# Patient Record
Sex: FEMALE | URBAN - METROPOLITAN AREA
[De-identification: names, ages, dates, MRNs, and addresses within clinical notes are randomized per-mention and may not be internally consistent; named-entity substitution may affect disease eponyms.]

---

## 2023-08-08 ENCOUNTER — INITIAL PRENATAL (OUTPATIENT)
Age: 28
End: 2023-08-08

## 2023-08-08 VITALS
BODY MASS INDEX: 21.92 KG/M2 | SYSTOLIC BLOOD PRESSURE: 102 MMHG | DIASTOLIC BLOOD PRESSURE: 64 MMHG | HEIGHT: 69 IN | WEIGHT: 148 LBS

## 2023-08-08 DIAGNOSIS — Z34.00 SUPERVISION OF NORMAL FIRST PREGNANCY, ANTEPARTUM: Primary | ICD-10-CM

## 2023-08-08 PROCEDURE — 0500F INITIAL PRENATAL CARE VISIT: CPT | Performed by: OBSTETRICS & GYNECOLOGY

## 2023-08-08 RX ORDER — LEVOTHYROXINE SODIUM 0.05 MG/1
TABLET ORAL
COMMUNITY
Start: 2015-12-04

## 2023-08-08 NOTE — PROGRESS NOTES
Current pregnancy history:    Ezekiel Mensah is a  32 y.o. female Unavailable Patient's last menstrual period was 06/04/2023. .  She presents for the evaluation of amenorrhea and a positive pregnancy test.    LMP history:  The date of her LMP is certain. Her last menstrual period was normal.  A urine pregnancy test was positive      Based on her LMP, her EDC is 3/10/24 and her EGA is 9 weeks,2 days. Her menstrual cycles are regular and occur approximately every 28 days  and range from 3 to 5 days. Ultrasound data:  She had an  ultrasound done by the ultrasound tech today which revealed a viable evans pregnancy with a gestational age of 10 weeks and 2 days giving an EDC of 3/9/23. Pregnancy symptoms:    Since her LMP she has experienced  urinary frequency, breast tenderness, fatigue, and and nausea. She has been vomiting over the last few weeks. Associated signs and symptoms which she denies: dysuria, discharge, vaginal bleeding. Relevant past pregnancy history:   She has the following pregnancy history: G1    Relevant past medical history:(relevant to this pregnancy): noncontributory. Hypothyroidism: takes synthroid - followed by THAN[facility], last TSH WNL 2 weeks ago. Acne - on topical clindamycin and azelaic acid    Pap/Occupational history:  Last pap smear: last year with  [facility], reports normal          Substance history: negative for alcohol, tobacco and street drugs. Positive for nothing. Exposure history: There is/are no indoor cat/s in the home. The patient was instructed to not change the cat litter. She admits close contact with children on a regular basis. She has had chicken pox or the vaccine in the past.   Patient denies issues with domestic violence. Genetic Screening/Teratology Counseling: (Includes patient, baby's father, or anyone in either family with:)  3.  Patient's age >/= 28 at EDC?--no  2.   Thalassemia ([place], [place], [address], or

## 2023-08-10 LAB — BACTERIA UR CULT: NO GROWTH

## 2023-08-11 LAB
C TRACH RRNA SPEC QL NAA+PROBE: NEGATIVE
N GONORRHOEA RRNA SPEC QL NAA+PROBE: NEGATIVE
T VAGINALIS RRNA SPEC QL NAA+PROBE: NEGATIVE

## 2023-08-28 SDOH — ECONOMIC STABILITY: INCOME INSECURITY: HOW HARD IS IT FOR YOU TO PAY FOR THE VERY BASICS LIKE FOOD, HOUSING, MEDICAL CARE, AND HEATING?: NOT HARD AT ALL

## 2023-08-28 SDOH — ECONOMIC STABILITY: TRANSPORTATION INSECURITY
IN THE PAST 12 MONTHS, HAS LACK OF TRANSPORTATION KEPT YOU FROM MEETINGS, WORK, OR FROM GETTING THINGS NEEDED FOR DAILY LIVING?: NO

## 2023-08-28 SDOH — ECONOMIC STABILITY: HOUSING INSECURITY
IN THE LAST 12 MONTHS, WAS THERE A TIME WHEN YOU DID NOT HAVE A STEADY PLACE TO SLEEP OR SLEPT IN A SHELTER (INCLUDING NOW)?: NO

## 2023-08-28 SDOH — ECONOMIC STABILITY: FOOD INSECURITY: WITHIN THE PAST 12 MONTHS, YOU WORRIED THAT YOUR FOOD WOULD RUN OUT BEFORE YOU GOT MONEY TO BUY MORE.: NEVER TRUE

## 2023-08-28 SDOH — ECONOMIC STABILITY: FOOD INSECURITY: WITHIN THE PAST 12 MONTHS, THE FOOD YOU BOUGHT JUST DIDN'T LAST AND YOU DIDN'T HAVE MONEY TO GET MORE.: NEVER TRUE

## 2023-08-29 ENCOUNTER — ROUTINE PRENATAL (OUTPATIENT)
Age: 28
End: 2023-08-29

## 2023-08-29 VITALS — WEIGHT: 148 LBS | DIASTOLIC BLOOD PRESSURE: 80 MMHG | SYSTOLIC BLOOD PRESSURE: 128 MMHG | BODY MASS INDEX: 22.18 KG/M2

## 2023-08-29 DIAGNOSIS — Z34.00 SUPERVISION OF NORMAL FIRST PREGNANCY, ANTEPARTUM: Primary | ICD-10-CM

## 2023-08-29 LAB
HEP B, EXTERNAL RESULT: NORMAL
HIV, EXTERNAL RESULT: NORMAL
RUBELLA TITER, EXTERNAL RESULT: NORMAL

## 2023-08-29 PROCEDURE — 0502F SUBSEQUENT PRENATAL CARE: CPT | Performed by: OBSTETRICS & GYNECOLOGY

## 2023-08-29 RX ORDER — PYRIDOXINE HCL (VITAMIN B6) 25 MG
TABLET ORAL
COMMUNITY
Start: 2023-07-25

## 2023-08-29 RX ORDER — AZELAIC ACID 0.15 G/G
GEL TOPICAL
COMMUNITY
Start: 2023-07-31

## 2023-08-29 RX ORDER — CLINDAMYCIN PHOSPHATE 10 MG/G
1 GEL TOPICAL
COMMUNITY
Start: 2023-07-26

## 2023-08-30 LAB
ABO GROUP BLD: NORMAL
BLD GP AB SCN SERPL QL: NEGATIVE
ERYTHROCYTE [DISTWIDTH] IN BLOOD BY AUTOMATED COUNT: 12 % (ref 11.7–15.4)
HBV SURFACE AG SERPL QL IA: NEGATIVE
HCT VFR BLD AUTO: 42.1 % (ref 34–46.6)
HCV IGG SERPL QL IA: NON REACTIVE
HGB BLD-MCNC: 14 G/DL (ref 11.1–15.9)
HIV 1+2 AB+HIV1 P24 AG SERPL QL IA: NON REACTIVE
MCH RBC QN AUTO: 32.5 PG (ref 26.6–33)
MCHC RBC AUTO-ENTMCNC: 33.3 G/DL (ref 31.5–35.7)
MCV RBC AUTO: 98 FL (ref 79–97)
PLATELET # BLD AUTO: 244 X10E3/UL (ref 150–450)
RBC # BLD AUTO: 4.31 X10E6/UL (ref 3.77–5.28)
RH BLD: POSITIVE
RUBV IGG SERPL IA-ACNC: 3.55 INDEX
TSH SERPL DL<=0.005 MIU/L-ACNC: 0.84 UIU/ML (ref 0.45–4.5)
VZV IGG SER IA-ACNC: 848 INDEX
WBC # BLD AUTO: 7.6 X10E3/UL (ref 3.4–10.8)

## 2023-08-31 ENCOUNTER — TELEPHONE (OUTPATIENT)
Age: 28
End: 2023-08-31

## 2023-08-31 LAB
HGB A MFR BLD ELPH: 97.1 % (ref 96.4–98.8)
HGB A2 MFR BLD ELPH: 2.9 % (ref 1.8–3.2)
HGB F MFR BLD ELPH: 0 % (ref 0–2)
HGB FRACT BLD-IMP: NORMAL
HGB S MFR BLD ELPH: 0 %
TREPONEMA PALLIDUM IGG+IGM AB [PRESENCE] IN SERUM OR PLASMA BY IMMUNOASSAY: NON REACTIVE

## 2023-09-04 LAB
Lab: NORMAL
NTRA 1P36 DELETION SYNDROME POPULATION-BASED RISK TEXT: NORMAL
NTRA 1P36 DELETION SYNDROME RESULT TEXT: NORMAL
NTRA 1P36 DELETION SYNDROME RISK SCORE TEXT: NORMAL
NTRA 22Q11.2 DELETION SYNDROME POPULATION-BASED RISK TEXT: NORMAL
NTRA 22Q11.2 DELETION SYNDROME RESULT TEXT: NORMAL
NTRA 22Q11.2 DELETION SYNDROME RISK SCORE TEXT: NORMAL
NTRA ANGELMAN SYNDROME POPULATION-BASED RISK TEXT: NORMAL
NTRA ANGELMAN SYNDROME RESULT TEXT: NORMAL
NTRA ANGELMAN SYNDROME RISK SCORE TEXT: NORMAL
NTRA CRI-DU-CHAT SYNDROME POPULATION-BASED RISK TEXT: NORMAL
NTRA CRI-DU-CHAT SYNDROME RESULT TEXT: NORMAL
NTRA CRI-DU-CHAT SYNDROME RISK SCORE TEXT: NORMAL
NTRA FETAL FRACTION: NORMAL
NTRA GENDER OF FETUS: NORMAL
NTRA MONOSOMY X AGE-BASED RISK TEXT: NORMAL
NTRA MONOSOMY X RESULT TEXT: NORMAL
NTRA MONOSOMY X RISK SCORE TEXT: NORMAL
NTRA PRADER-WILLI SYNDROME POPULATION-BASED RISK TEXT: NORMAL
NTRA PRADER-WILLI SYNDROME RESULT TEXT: NORMAL
NTRA PRADER-WILLI SYNDROME RISK SCORE TEXT: NORMAL
NTRA TRIPLOIDY RESULT TEXT: NORMAL
NTRA TRISOMY 13 AGE-BASED RISK TEXT: NORMAL
NTRA TRISOMY 13 RESULT TEXT: NORMAL
NTRA TRISOMY 13 RISK SCORE TEXT: NORMAL
NTRA TRISOMY 18 AGE-BASED RISK TEXT: NORMAL
NTRA TRISOMY 18 RESULT TEXT: NORMAL
NTRA TRISOMY 18 RISK SCORE TEXT: NORMAL
NTRA TRISOMY 21 AGE-BASED RISK TEXT: NORMAL
NTRA TRISOMY 21 RESULT TEXT: NORMAL
NTRA TRISOMY 21 RISK SCORE TEXT: NORMAL

## 2023-09-29 ENCOUNTER — ROUTINE PRENATAL (OUTPATIENT)
Age: 28
End: 2023-09-29

## 2023-09-29 VITALS — SYSTOLIC BLOOD PRESSURE: 120 MMHG | DIASTOLIC BLOOD PRESSURE: 82 MMHG | BODY MASS INDEX: 22.18 KG/M2 | WEIGHT: 148 LBS

## 2023-09-29 DIAGNOSIS — Z34.00 SUPERVISION OF NORMAL FIRST PREGNANCY, ANTEPARTUM: ICD-10-CM

## 2023-09-29 DIAGNOSIS — Z34.90 ENCOUNTER FOR SUPERVISION OF NORMAL PREGNANCY, ANTEPARTUM, UNSPECIFIED GRAVIDITY: Primary | ICD-10-CM

## 2023-09-29 NOTE — PROGRESS NOTES
Patient arrived to room with no concerns   Would like to discuss Thyroid levels today to see about blood draw    flu vaccine today

## 2023-09-30 LAB
SPECIMEN STATUS REPORT: NORMAL
TSH SERPL DL<=0.005 MIU/L-ACNC: 1.85 UIU/ML (ref 0.45–4.5)

## 2023-10-13 ENCOUNTER — TELEPHONE (OUTPATIENT)
Age: 28
End: 2023-10-13

## 2023-10-13 DIAGNOSIS — R00.0 RAPID HEART RATE: Primary | ICD-10-CM

## 2023-10-13 NOTE — TELEPHONE ENCOUNTER
Patient called and identity confirmed. Patient informed that a cardiology appointment had been placed with Dr. Elham Powell on 10/24/2023 at 2:40. Patient states this is a good time for her and had no further questions.

## 2023-10-13 NOTE — TELEPHONE ENCOUNTER
Patient called in to check on the status of her cardiology appt. Message has been sent to the 's nurse to check on this.

## 2023-10-24 ENCOUNTER — OFFICE VISIT (OUTPATIENT)
Age: 28
End: 2023-10-24

## 2023-10-24 VITALS
OXYGEN SATURATION: 99 % | HEIGHT: 69 IN | WEIGHT: 150.2 LBS | HEART RATE: 87 BPM | BODY MASS INDEX: 22.25 KG/M2 | DIASTOLIC BLOOD PRESSURE: 88 MMHG | SYSTOLIC BLOOD PRESSURE: 118 MMHG

## 2023-10-24 DIAGNOSIS — Z34.90 PREGNANCY, UNSPECIFIED GESTATIONAL AGE: ICD-10-CM

## 2023-10-24 DIAGNOSIS — R00.2 HEART PALPITATIONS: Primary | ICD-10-CM

## 2023-10-24 DIAGNOSIS — R20.2 TINGLING IN EXTREMITIES: ICD-10-CM

## 2023-10-24 DIAGNOSIS — R06.02 SHORT OF BREATH ON EXERTION: ICD-10-CM

## 2023-10-24 ASSESSMENT — PATIENT HEALTH QUESTIONNAIRE - PHQ9
SUM OF ALL RESPONSES TO PHQ QUESTIONS 1-9: 0
SUM OF ALL RESPONSES TO PHQ QUESTIONS 1-9: 0
SUM OF ALL RESPONSES TO PHQ9 QUESTIONS 1 & 2: 0
1. LITTLE INTEREST OR PLEASURE IN DOING THINGS: 0
SUM OF ALL RESPONSES TO PHQ QUESTIONS 1-9: 0
SUM OF ALL RESPONSES TO PHQ QUESTIONS 1-9: 0
2. FEELING DOWN, DEPRESSED OR HOPELESS: 0

## 2023-10-24 NOTE — PROGRESS NOTES
[facility] [place]: Devonte Cherry  [phone]    History of Present Illness:  Ms. Odilon Woodruff is a 31 yo F followed by Dr. Harrison Brady and Dr. Roshni Rao, who is pregnant with a due date of 03/10/2024, hypothyroid, referred here for cardiac evaluation. She is accompanied by her . She has had various symptoms recently. She has had palpitations that started with her pregnancy, got better in the second trimester, but then started recurring two weeks ago. When this happens, she will feel her heart rate speed up that can last several minutes at a time. She did have a spell yesterday. She has a watch that monitors her heart rate as well. Anytime her heart rate goes greater than 70 beats per minute she will start feeling palpitations. Usually at rest, it is in the 60s. Initially, she thought it might be related to food, but this has not consistently been the case. She has felt more easily short of breath with exertion recently. No exertional chest pain. This is her first pregnancy. She also notes some soreness and tingling in her right arm. This often times happens in late morning. 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 She uses a mouse and a phone a lot. Sometimes she has cold hands and feet. She is compensated on exam with clear lungs and no lower extremity edema. Her EKG here is normal sinus rhythm, heart rate of 86. Soc hx. No tobacco  Fam hx. No early CAD. Maternal GF, CAD 46s. Assessment and Plan:   1. Palpitations, shortness of breath. Will obtain and echocardiogram and one month loop monitor to evaluate for possible arrhythmia. 2. Pregnancy. Followed by Dr. Harrison Brady. 3. Hypothyroid. Has been stable per the patient. She  has a past medical history of Hypothyroidism. All other systems negative except as above.      PE  Vitals:    10/24/23 1431   BP: 118/88   Site: Left Upper Arm   Position: Sitting   Cuff Size: Medium Adult   Pulse: 87   SpO2: 99%   Weight: 68.1 kg (150 lb 3.2 oz)

## 2023-10-24 NOTE — PATIENT INSTRUCTIONS
A 30 day loop monitor has been ordered for you. This will be mailed to the address given to us. Please read over the instructions given to you about [employer] loop monitors. Save the pre-paid box so that you can use it to mail monitor back to company. Your heart rate and rhythm will be monitored continuously and our office will be notified regarding any concerns. If you have any insurance questions regarding coverage and out of pocket cost please contact the number on the instructions - [phone].

## 2023-10-25 ENCOUNTER — TELEPHONE (OUTPATIENT)
Age: 28
End: 2023-10-25

## 2023-10-25 NOTE — TELEPHONE ENCOUNTER
Enrolled with [employer] - Ordered and being shipped to patient's home address on file. ETA within 5-7 business days.       1 month loop  Received: NEENA Loyola,      Please schedule a 1 month loop with sensitive stickers for palpitations and sob per Dr. Edison Kamara.     Thanks

## 2023-10-27 ENCOUNTER — ROUTINE PRENATAL (OUTPATIENT)
Age: 28
End: 2023-10-27

## 2023-10-27 VITALS — DIASTOLIC BLOOD PRESSURE: 70 MMHG | SYSTOLIC BLOOD PRESSURE: 110 MMHG

## 2023-10-27 DIAGNOSIS — Z34.90 ENCOUNTER FOR SUPERVISION OF NORMAL PREGNANCY, ANTEPARTUM, UNSPECIFIED GRAVIDITY: Primary | ICD-10-CM

## 2023-10-27 NOTE — PROGRESS NOTES
US today shows the following:  FETAL SURVEY A SINGLE VIABLE IUP AT 20W5D IS SEEN. FETAL CARDIAC MOTION OBSERVED. FETAL ANATOMY WAS WELL VISUALIZED AND APPEARS WNL. NO ABNORMALITIES WERE SEEN ON TODAYS EXAM. APPROPRIATE GROWTH MEASURED. SIZE = DATES. BETH, PLACENTA AND CERVIX APPEAR WITHIN NORMAL LIMITS. GENDER: MALE    Patient arrived to room with no concerns or complaints  +FM    Patient states that she saw cardiologist and everything looked well, but will be doing a holter monitor for a month and has an echo scheduled for next week.

## 2023-11-03 ENCOUNTER — TELEPHONE (OUTPATIENT)
Age: 28
End: 2023-11-03

## 2023-11-03 NOTE — TELEPHONE ENCOUNTER
----- Message from Axel Adams MD sent at 11/2/2023  8:07 PM EDT -----  Please let pt know echo was overall normal. thx

## 2023-11-21 ENCOUNTER — ROUTINE PRENATAL (OUTPATIENT)
Age: 28
End: 2023-11-21

## 2023-11-21 VITALS — SYSTOLIC BLOOD PRESSURE: 114 MMHG | BODY MASS INDEX: 23.42 KG/M2 | WEIGHT: 154 LBS | DIASTOLIC BLOOD PRESSURE: 74 MMHG

## 2023-11-21 DIAGNOSIS — Z34.00 SUPERVISION OF NORMAL FIRST PREGNANCY, ANTEPARTUM: Primary | ICD-10-CM

## 2023-11-21 PROCEDURE — 0502F SUBSEQUENT PRENATAL CARE: CPT | Performed by: OBSTETRICS & GYNECOLOGY

## 2023-11-21 NOTE — PROGRESS NOTES
Doing well. She was seen by cardiologist 3 weeks ago. Normal echo. Currently has a holter monitor.  precautions

## 2023-12-11 ENCOUNTER — TELEPHONE (OUTPATIENT)
Age: 28
End: 2023-12-11

## 2023-12-11 NOTE — TELEPHONE ENCOUNTER
----- Message from Ginger De La Torre MD sent at 12/11/2023  8:20 AM EST -----  Please let pt know heart monitor was normal. No evidence of arrhythmia.  thx

## 2023-12-19 LAB — T. PALLIDUM (SYPHILIS) ANTIBODY, EXTERNAL RESULT: NORMAL

## 2024-01-12 ENCOUNTER — ROUTINE PRENATAL (OUTPATIENT)
Age: 29
End: 2024-01-12

## 2024-01-12 VITALS — BODY MASS INDEX: 24.48 KG/M2 | WEIGHT: 161 LBS | SYSTOLIC BLOOD PRESSURE: 100 MMHG | DIASTOLIC BLOOD PRESSURE: 66 MMHG

## 2024-01-12 DIAGNOSIS — Z34.90 PREGNANCY, UNSPECIFIED GESTATIONAL AGE: Primary | ICD-10-CM

## 2024-01-12 PROCEDURE — 0502F SUBSEQUENT PRENATAL CARE: CPT | Performed by: OBSTETRICS & GYNECOLOGY

## 2024-01-30 ENCOUNTER — ROUTINE PRENATAL (OUTPATIENT)
Age: 29
End: 2024-01-30

## 2024-01-30 VITALS — WEIGHT: 165 LBS | DIASTOLIC BLOOD PRESSURE: 78 MMHG | SYSTOLIC BLOOD PRESSURE: 116 MMHG | BODY MASS INDEX: 25.09 KG/M2

## 2024-01-30 DIAGNOSIS — Z34.00 SUPERVISION OF NORMAL FIRST PREGNANCY, ANTEPARTUM: Primary | ICD-10-CM

## 2024-01-30 PROCEDURE — 0502F SUBSEQUENT PRENATAL CARE: CPT | Performed by: OBSTETRICS & GYNECOLOGY

## 2024-02-13 ENCOUNTER — ROUTINE PRENATAL (OUTPATIENT)
Age: 29
End: 2024-02-13

## 2024-02-13 VITALS — SYSTOLIC BLOOD PRESSURE: 120 MMHG | BODY MASS INDEX: 25.24 KG/M2 | WEIGHT: 166 LBS | DIASTOLIC BLOOD PRESSURE: 74 MMHG

## 2024-02-13 DIAGNOSIS — Z34.00 SUPERVISION OF NORMAL FIRST PREGNANCY, ANTEPARTUM: ICD-10-CM

## 2024-02-13 DIAGNOSIS — Z34.03 ENCOUNTER FOR SUPERVISION OF NORMAL FIRST PREGNANCY IN THIRD TRIMESTER: Primary | ICD-10-CM

## 2024-02-13 LAB
C. TRACHOMATIS, EXTERNAL RESULT: NORMAL
GBS, EXTERNAL RESULT: NORMAL
N. GONORRHOEAE, EXTERNAL RESULT: NORMAL

## 2024-02-13 PROCEDURE — 0502F SUBSEQUENT PRENATAL CARE: CPT | Performed by: OBSTETRICS & GYNECOLOGY

## 2024-02-13 RX ORDER — BREAST PUMP
EACH MISCELLANEOUS
Qty: 1 EACH | Refills: 0 | Status: SHIPPED | OUTPATIENT
Start: 2024-02-13

## 2024-02-13 NOTE — PROGRESS NOTES
GBS today, good fetal movement.  Breech confirmed by Vscan; Conservative management fully reviewed and all questions answered.   US next visit; briefly reviewed version

## 2024-02-15 LAB — GP B STREP DNA SPEC QL NAA+PROBE: NEGATIVE

## 2024-02-19 ENCOUNTER — ROUTINE PRENATAL (OUTPATIENT)
Age: 29
End: 2024-02-19

## 2024-02-19 VITALS — DIASTOLIC BLOOD PRESSURE: 64 MMHG | SYSTOLIC BLOOD PRESSURE: 100 MMHG | BODY MASS INDEX: 25.39 KG/M2 | WEIGHT: 167 LBS

## 2024-02-19 DIAGNOSIS — Z34.03 ENCOUNTER FOR SUPERVISION OF NORMAL FIRST PREGNANCY IN THIRD TRIMESTER: Primary | ICD-10-CM

## 2024-02-19 DIAGNOSIS — Z34.00 SUPERVISION OF NORMAL FIRST PREGNANCY, ANTEPARTUM: ICD-10-CM

## 2024-02-19 PROCEDURE — 0502F SUBSEQUENT PRENATAL CARE: CPT | Performed by: OBSTETRICS & GYNECOLOGY

## 2024-02-19 NOTE — PROGRESS NOTES
Ultrasound today. Good FM. Getting uncomfortable.  Thyroid medication changed to two tabs twice weekly now.   Baby is cephalic on US today!!    LIMITED OB SCAN A SINGLE VERTEX 37W1D IUP IS SEEN. FETAL CARDIAC MOTION OBSERVED. LIMITED ANATOMY WAS VISUALIZED AND APPEARS WNL. EFW: 6LB 10 OZ ( 44 %) BETH: 15.4 CM PLACENTA APPEARS WNL

## 2024-02-26 ENCOUNTER — ROUTINE PRENATAL (OUTPATIENT)
Age: 29
End: 2024-02-26

## 2024-02-26 VITALS — SYSTOLIC BLOOD PRESSURE: 120 MMHG | BODY MASS INDEX: 25.54 KG/M2 | DIASTOLIC BLOOD PRESSURE: 70 MMHG | WEIGHT: 168 LBS

## 2024-02-26 DIAGNOSIS — Z34.00 SUPERVISION OF NORMAL FIRST PREGNANCY, ANTEPARTUM: Primary | ICD-10-CM

## 2024-02-26 NOTE — PROGRESS NOTES
Pt doing well. She c/o acid reflux.  FHTs 160s    NST procedure note    Gosia Miguel is a ,  28 y.o. female Unavailable whose Patient's last menstrual period was 2023.  was on  who presents for fetal non-stress test.    She is 38w1d and was monitored for 25 minutes and the FHR was reactive, with accelerations.    NST Interpretation:    FHR baseline 145 bpm, variability moderate, accelerations present, decelerations Absent.    Uterine contractions were absent.    Gosia was informed of the NST results and her questions were answered.

## 2024-03-06 ENCOUNTER — ROUTINE PRENATAL (OUTPATIENT)
Age: 29
End: 2024-03-06

## 2024-03-06 VITALS — SYSTOLIC BLOOD PRESSURE: 108 MMHG | WEIGHT: 170 LBS | DIASTOLIC BLOOD PRESSURE: 66 MMHG | BODY MASS INDEX: 25.85 KG/M2

## 2024-03-06 DIAGNOSIS — Z34.03 ENCOUNTER FOR SUPERVISION OF NORMAL FIRST PREGNANCY IN THIRD TRIMESTER: Primary | ICD-10-CM

## 2024-03-06 PROCEDURE — 0502F SUBSEQUENT PRENATAL CARE: CPT | Performed by: OBSTETRICS & GYNECOLOGY

## 2024-03-11 ENCOUNTER — ROUTINE PRENATAL (OUTPATIENT)
Age: 29
End: 2024-03-11

## 2024-03-11 VITALS — WEIGHT: 167 LBS | DIASTOLIC BLOOD PRESSURE: 76 MMHG | BODY MASS INDEX: 25.39 KG/M2 | SYSTOLIC BLOOD PRESSURE: 118 MMHG

## 2024-03-11 DIAGNOSIS — Z34.03 ENCOUNTER FOR SUPERVISION OF NORMAL FIRST PREGNANCY IN THIRD TRIMESTER: Primary | ICD-10-CM

## 2024-03-11 PROCEDURE — 0502F SUBSEQUENT PRENATAL CARE: CPT | Performed by: OBSTETRICS & GYNECOLOGY

## 2024-03-11 NOTE — PROGRESS NOTES
Ultrasound today. Good FM.  Desires indxn this week  LIMITED OB SCAN A SINGLE VERTEX 40W1D IUP IS SEEN. FETAL CARDIAC MOTION OBSERVED. LIMITED ANATOMY WAS VISUALIZED AND APPEARS WNL. EFW= 7 LB 7OZ ( 41 %) BPP=8/8 BETH= 15.4 CM PLACENTA APPEARS WITHIN NORMAL LIMITS

## 2024-03-13 ENCOUNTER — ROUTINE PRENATAL (OUTPATIENT)
Age: 29
End: 2024-03-13

## 2024-03-13 ENCOUNTER — HOSPITAL ENCOUNTER (INPATIENT)
Facility: HOSPITAL | Age: 29
LOS: 3 days | Discharge: HOME | End: 2024-03-16
Attending: OBSTETRICS & GYNECOLOGY | Admitting: OBSTETRICS & GYNECOLOGY
Payer: COMMERCIAL

## 2024-03-13 VITALS — SYSTOLIC BLOOD PRESSURE: 118 MMHG | BODY MASS INDEX: 25.54 KG/M2 | DIASTOLIC BLOOD PRESSURE: 76 MMHG | WEIGHT: 168 LBS

## 2024-03-13 DIAGNOSIS — O26.893 ABDOMINAL PAIN DURING PREGNANCY IN THIRD TRIMESTER: ICD-10-CM

## 2024-03-13 DIAGNOSIS — R10.9 ABDOMINAL PAIN DURING PREGNANCY IN THIRD TRIMESTER: ICD-10-CM

## 2024-03-13 DIAGNOSIS — Z3A.40 40 WEEKS GESTATION OF PREGNANCY: Primary | ICD-10-CM

## 2024-03-13 DIAGNOSIS — Z34.03 ENCOUNTER FOR SUPERVISION OF NORMAL FIRST PREGNANCY IN THIRD TRIMESTER: Primary | ICD-10-CM

## 2024-03-13 PROCEDURE — 4500000002 HC ER NO CHARGE

## 2024-03-13 PROCEDURE — 0502F SUBSEQUENT PRENATAL CARE: CPT | Performed by: OBSTETRICS & GYNECOLOGY

## 2024-03-13 PROCEDURE — 7210000100 HC LABOR FEE PER 1 HR

## 2024-03-13 PROCEDURE — 99284 EMERGENCY DEPT VISIT MOD MDM: CPT

## 2024-03-13 PROCEDURE — 1100000000 HC RM PRIVATE

## 2024-03-13 RX ORDER — TERBUTALINE SULFATE 1 MG/ML
0.25 INJECTION, SOLUTION SUBCUTANEOUS
Status: DISCONTINUED | OUTPATIENT
Start: 2024-03-13 | End: 2024-03-14

## 2024-03-13 RX ORDER — CARBOPROST TROMETHAMINE 250 UG/ML
250 INJECTION, SOLUTION INTRAMUSCULAR PRN
Status: DISCONTINUED | OUTPATIENT
Start: 2024-03-13 | End: 2024-03-14

## 2024-03-13 RX ORDER — METHYLERGONOVINE MALEATE 0.2 MG/ML
200 INJECTION INTRAVENOUS PRN
Status: DISCONTINUED | OUTPATIENT
Start: 2024-03-13 | End: 2024-03-14

## 2024-03-13 RX ORDER — SODIUM CHLORIDE, SODIUM LACTATE, POTASSIUM CHLORIDE, AND CALCIUM CHLORIDE .6; .31; .03; .02 G/100ML; G/100ML; G/100ML; G/100ML
1000 INJECTION, SOLUTION INTRAVENOUS PRN
Status: DISCONTINUED | OUTPATIENT
Start: 2024-03-13 | End: 2024-03-14

## 2024-03-13 RX ORDER — SODIUM CHLORIDE, SODIUM LACTATE, POTASSIUM CHLORIDE, AND CALCIUM CHLORIDE .6; .31; .03; .02 G/100ML; G/100ML; G/100ML; G/100ML
500 INJECTION, SOLUTION INTRAVENOUS PRN
Status: DISCONTINUED | OUTPATIENT
Start: 2024-03-13 | End: 2024-03-14

## 2024-03-13 RX ORDER — ONDANSETRON 2 MG/ML
4 INJECTION INTRAMUSCULAR; INTRAVENOUS EVERY 6 HOURS PRN
Status: DISCONTINUED | OUTPATIENT
Start: 2024-03-13 | End: 2024-03-14

## 2024-03-13 RX ORDER — HYDROMORPHONE HYDROCHLORIDE 1 MG/ML
1 INJECTION, SOLUTION INTRAMUSCULAR; INTRAVENOUS; SUBCUTANEOUS EVERY 4 HOURS PRN
Status: DISCONTINUED | OUTPATIENT
Start: 2024-03-13 | End: 2024-03-14

## 2024-03-13 RX ORDER — SODIUM CHLORIDE 0.9 % (FLUSH) 0.9 %
5-40 SYRINGE (ML) INJECTION EVERY 12 HOURS SCHEDULED
Status: DISCONTINUED | OUTPATIENT
Start: 2024-03-13 | End: 2024-03-14

## 2024-03-13 RX ORDER — SODIUM CHLORIDE, SODIUM LACTATE, POTASSIUM CHLORIDE, CALCIUM CHLORIDE 600; 310; 30; 20 MG/100ML; MG/100ML; MG/100ML; MG/100ML
INJECTION, SOLUTION INTRAVENOUS CONTINUOUS
Status: DISCONTINUED | OUTPATIENT
Start: 2024-03-13 | End: 2024-03-14

## 2024-03-13 RX ORDER — SODIUM CHLORIDE 0.9 % (FLUSH) 0.9 %
5-40 SYRINGE (ML) INJECTION PRN
Status: DISCONTINUED | OUTPATIENT
Start: 2024-03-13 | End: 2024-03-14

## 2024-03-13 RX ORDER — SEVOFLURANE 250 ML/250ML
1 LIQUID RESPIRATORY (INHALATION) CONTINUOUS PRN
Status: DISCONTINUED | OUTPATIENT
Start: 2024-03-13 | End: 2024-03-14

## 2024-03-13 RX ORDER — CETIRIZINE HYDROCHLORIDE 10 MG/1
10 TABLET ORAL DAILY
COMMUNITY

## 2024-03-13 RX ORDER — ACETAMINOPHEN 325 MG/1
650 TABLET ORAL EVERY 4 HOURS PRN
Status: DISCONTINUED | OUTPATIENT
Start: 2024-03-13 | End: 2024-03-14

## 2024-03-13 RX ORDER — SODIUM CHLORIDE 9 MG/ML
25 INJECTION, SOLUTION INTRAVENOUS PRN
Status: DISCONTINUED | OUTPATIENT
Start: 2024-03-13 | End: 2024-03-14

## 2024-03-13 RX ORDER — ONDANSETRON 4 MG/1
4 TABLET, ORALLY DISINTEGRATING ORAL EVERY 6 HOURS PRN
Status: DISCONTINUED | OUTPATIENT
Start: 2024-03-13 | End: 2024-03-14

## 2024-03-13 ASSESSMENT — ENCOUNTER SYMPTOMS
NAUSEA: 0
SHORTNESS OF BREATH: 0
DIARRHEA: 0
COUGH: 0
ABDOMINAL PAIN: 1
SORE THROAT: 0
VOMITING: 0

## 2024-03-13 NOTE — PROGRESS NOTES
Pt c/o cramping and urinry symptoms since yesterday PM.  Labor precautions  Coming in for german tomorrow

## 2024-03-14 ENCOUNTER — ANESTHESIA (OUTPATIENT)
Facility: HOSPITAL | Age: 29
End: 2024-03-14
Payer: COMMERCIAL

## 2024-03-14 ENCOUNTER — ANESTHESIA EVENT (OUTPATIENT)
Facility: HOSPITAL | Age: 29
End: 2024-03-14
Payer: COMMERCIAL

## 2024-03-14 LAB
ABO + RH BLD: NORMAL
BLOOD GROUP ANTIBODIES SERPL: NORMAL
ERYTHROCYTE [DISTWIDTH] IN BLOOD BY AUTOMATED COUNT: 12.5 % (ref 11.5–14.5)
HCT VFR BLD AUTO: 39.5 % (ref 35–47)
HGB BLD-MCNC: 13.6 G/DL (ref 11.5–16)
MCH RBC QN AUTO: 33.8 PG (ref 26–34)
MCHC RBC AUTO-ENTMCNC: 34.4 G/DL (ref 30–36.5)
MCV RBC AUTO: 98.3 FL (ref 80–99)
NRBC # BLD: 0 K/UL (ref 0–0.01)
NRBC BLD-RTO: 0 PER 100 WBC
PLATELET # BLD AUTO: 169 K/UL (ref 150–400)
PMV BLD AUTO: 10.2 FL (ref 8.9–12.9)
RBC # BLD AUTO: 4.02 M/UL (ref 3.8–5.2)
RPR SER QL: NONREACTIVE
SPECIMEN EXP DATE BLD: NORMAL
WBC # BLD AUTO: 17.9 K/UL (ref 3.6–11)

## 2024-03-14 PROCEDURE — 2500000003 HC RX 250 WO HCPCS: Performed by: STUDENT IN AN ORGANIZED HEALTH CARE EDUCATION/TRAINING PROGRAM

## 2024-03-14 PROCEDURE — 86901 BLOOD TYPING SEROLOGIC RH(D): CPT

## 2024-03-14 PROCEDURE — 6360000002 HC RX W HCPCS: Performed by: STUDENT IN AN ORGANIZED HEALTH CARE EDUCATION/TRAINING PROGRAM

## 2024-03-14 PROCEDURE — 86850 RBC ANTIBODY SCREEN: CPT

## 2024-03-14 PROCEDURE — 10907ZC DRAINAGE OF AMNIOTIC FLUID, THERAPEUTIC FROM PRODUCTS OF CONCEPTION, VIA NATURAL OR ARTIFICIAL OPENING: ICD-10-PCS | Performed by: OBSTETRICS & GYNECOLOGY

## 2024-03-14 PROCEDURE — 7210000100 HC LABOR FEE PER 1 HR

## 2024-03-14 PROCEDURE — 2500000003 HC RX 250 WO HCPCS

## 2024-03-14 PROCEDURE — 3700000156 HC EPIDURAL ANESTHESIA

## 2024-03-14 PROCEDURE — 6370000000 HC RX 637 (ALT 250 FOR IP): Performed by: OBSTETRICS & GYNECOLOGY

## 2024-03-14 PROCEDURE — 1120000000 HC RM PRIVATE OB

## 2024-03-14 PROCEDURE — 36415 COLL VENOUS BLD VENIPUNCTURE: CPT

## 2024-03-14 PROCEDURE — 6360000002 HC RX W HCPCS: Performed by: OBSTETRICS & GYNECOLOGY

## 2024-03-14 PROCEDURE — 2580000003 HC RX 258: Performed by: ADVANCED PRACTICE MIDWIFE

## 2024-03-14 PROCEDURE — 7100000000 HC PACU RECOVERY - FIRST 15 MIN

## 2024-03-14 PROCEDURE — 7220000101 HC DELIVERY VAGINAL/SINGLE

## 2024-03-14 PROCEDURE — 2580000003 HC RX 258: Performed by: OBSTETRICS & GYNECOLOGY

## 2024-03-14 PROCEDURE — 00HU33Z INSERTION OF INFUSION DEVICE INTO SPINAL CANAL, PERCUTANEOUS APPROACH: ICD-10-PCS | Performed by: STUDENT IN AN ORGANIZED HEALTH CARE EDUCATION/TRAINING PROGRAM

## 2024-03-14 PROCEDURE — 3700000025 EPIDURAL BLOCK: Performed by: STUDENT IN AN ORGANIZED HEALTH CARE EDUCATION/TRAINING PROGRAM

## 2024-03-14 PROCEDURE — 86900 BLOOD TYPING SEROLOGIC ABO: CPT

## 2024-03-14 PROCEDURE — 7100000001 HC PACU RECOVERY - ADDTL 15 MIN

## 2024-03-14 PROCEDURE — 6360000002 HC RX W HCPCS: Performed by: ADVANCED PRACTICE MIDWIFE

## 2024-03-14 PROCEDURE — 86592 SYPHILIS TEST NON-TREP QUAL: CPT

## 2024-03-14 PROCEDURE — 51701 INSERT BLADDER CATHETER: CPT

## 2024-03-14 PROCEDURE — 85027 COMPLETE CBC AUTOMATED: CPT

## 2024-03-14 RX ORDER — SODIUM CHLORIDE 9 MG/ML
INJECTION, SOLUTION INTRAVENOUS PRN
Status: DISCONTINUED | OUTPATIENT
Start: 2024-03-14 | End: 2024-03-16 | Stop reason: HOSPADM

## 2024-03-14 RX ORDER — FENTANYL CITRATE 50 UG/ML
INJECTION, SOLUTION INTRAMUSCULAR; INTRAVENOUS PRN
Status: DISCONTINUED | OUTPATIENT
Start: 2024-03-14 | End: 2024-03-14 | Stop reason: SDUPTHER

## 2024-03-14 RX ORDER — BUPIVACAINE HYDROCHLORIDE 2.5 MG/ML
INJECTION, SOLUTION EPIDURAL; INFILTRATION; INTRACAUDAL PRN
Status: DISCONTINUED | OUTPATIENT
Start: 2024-03-14 | End: 2024-03-14 | Stop reason: SDUPTHER

## 2024-03-14 RX ORDER — BUPIVACAINE HYDROCHLORIDE 2.5 MG/ML
INJECTION, SOLUTION EPIDURAL; INFILTRATION; INTRACAUDAL
Status: COMPLETED
Start: 2024-03-14 | End: 2024-03-14

## 2024-03-14 RX ORDER — ONDANSETRON 4 MG/1
8 TABLET, ORALLY DISINTEGRATING ORAL EVERY 8 HOURS PRN
Status: DISCONTINUED | OUTPATIENT
Start: 2024-03-14 | End: 2024-03-16 | Stop reason: HOSPADM

## 2024-03-14 RX ORDER — MISOPROSTOL 200 UG/1
400 TABLET ORAL PRN
Status: DISCONTINUED | OUTPATIENT
Start: 2024-03-14 | End: 2024-03-14

## 2024-03-14 RX ORDER — LEVOTHYROXINE SODIUM 0.05 MG/1
50 TABLET ORAL DAILY
Status: DISCONTINUED | OUTPATIENT
Start: 2024-03-14 | End: 2024-03-16 | Stop reason: HOSPADM

## 2024-03-14 RX ORDER — IBUPROFEN 400 MG/1
800 TABLET ORAL EVERY 8 HOURS SCHEDULED
Status: DISCONTINUED | OUTPATIENT
Start: 2024-03-14 | End: 2024-03-16 | Stop reason: HOSPADM

## 2024-03-14 RX ORDER — LIDOCAINE HCL/EPINEPHRINE/PF 2%-1:200K
VIAL (ML) INJECTION
Status: DISCONTINUED
Start: 2024-03-14 | End: 2024-03-14

## 2024-03-14 RX ORDER — ONDANSETRON 2 MG/ML
4 INJECTION INTRAMUSCULAR; INTRAVENOUS EVERY 6 HOURS PRN
Status: DISCONTINUED | OUTPATIENT
Start: 2024-03-14 | End: 2024-03-14

## 2024-03-14 RX ORDER — ACETAMINOPHEN 500 MG
1000 TABLET ORAL EVERY 8 HOURS SCHEDULED
Status: DISCONTINUED | OUTPATIENT
Start: 2024-03-14 | End: 2024-03-16 | Stop reason: HOSPADM

## 2024-03-14 RX ORDER — LIDOCAINE HYDROCHLORIDE AND EPINEPHRINE BITARTRATE 20; .01 MG/ML; MG/ML
INJECTION, SOLUTION SUBCUTANEOUS PRN
Status: DISCONTINUED | OUTPATIENT
Start: 2024-03-14 | End: 2024-03-14 | Stop reason: SDUPTHER

## 2024-03-14 RX ORDER — SODIUM CHLORIDE 0.9 % (FLUSH) 0.9 %
5-40 SYRINGE (ML) INJECTION PRN
Status: DISCONTINUED | OUTPATIENT
Start: 2024-03-14 | End: 2024-03-16 | Stop reason: HOSPADM

## 2024-03-14 RX ORDER — SODIUM CHLORIDE 0.9 % (FLUSH) 0.9 %
5-40 SYRINGE (ML) INJECTION EVERY 12 HOURS SCHEDULED
Status: DISCONTINUED | OUTPATIENT
Start: 2024-03-14 | End: 2024-03-16 | Stop reason: HOSPADM

## 2024-03-14 RX ORDER — IBUPROFEN 400 MG/1
800 TABLET ORAL EVERY 8 HOURS SCHEDULED
Status: DISCONTINUED | OUTPATIENT
Start: 2024-03-14 | End: 2024-03-14

## 2024-03-14 RX ORDER — DIPHENHYDRAMINE HYDROCHLORIDE 50 MG/ML
12.5 INJECTION INTRAMUSCULAR; INTRAVENOUS EVERY 4 HOURS PRN
Status: DISCONTINUED | OUTPATIENT
Start: 2024-03-14 | End: 2024-03-14

## 2024-03-14 RX ORDER — DOCUSATE SODIUM 100 MG/1
100 CAPSULE, LIQUID FILLED ORAL 2 TIMES DAILY
Status: DISCONTINUED | OUTPATIENT
Start: 2024-03-14 | End: 2024-03-16 | Stop reason: HOSPADM

## 2024-03-14 RX ORDER — FENTANYL/BUPIVACAINE/NS/PF 2-1250MCG
1-15 PLASTIC BAG, INJECTION (ML) INJECTION CONTINUOUS
Status: DISCONTINUED | OUTPATIENT
Start: 2024-03-14 | End: 2024-03-14

## 2024-03-14 RX ORDER — MODIFIED LANOLIN
OINTMENT (GRAM) TOPICAL PRN
Status: DISCONTINUED | OUTPATIENT
Start: 2024-03-14 | End: 2024-03-16 | Stop reason: HOSPADM

## 2024-03-14 RX ORDER — FENTANYL CITRATE 50 UG/ML
INJECTION, SOLUTION INTRAMUSCULAR; INTRAVENOUS
Status: DISCONTINUED
Start: 2024-03-14 | End: 2024-03-14

## 2024-03-14 RX ORDER — NALOXONE HYDROCHLORIDE 0.4 MG/ML
INJECTION, SOLUTION INTRAMUSCULAR; INTRAVENOUS; SUBCUTANEOUS PRN
Status: DISCONTINUED | OUTPATIENT
Start: 2024-03-14 | End: 2024-03-14

## 2024-03-14 RX ORDER — FENTANYL/BUPIVACAINE/NS/PF 2-1250MCG
PLASTIC BAG, INJECTION (ML) INJECTION
Status: COMPLETED
Start: 2024-03-14 | End: 2024-03-14

## 2024-03-14 RX ORDER — EPHEDRINE SULFATE 50 MG/ML
INJECTION INTRAVENOUS
Status: DISCONTINUED
Start: 2024-03-14 | End: 2024-03-14 | Stop reason: WASHOUT

## 2024-03-14 RX ADMIN — BUPIVACAINE HYDROCHLORIDE 10 ML: 2.5 INJECTION, SOLUTION EPIDURAL; INFILTRATION; INTRACAUDAL; PERINEURAL at 08:10

## 2024-03-14 RX ADMIN — FENTANYL CITRATE 100 MCG: 50 INJECTION, SOLUTION INTRAMUSCULAR; INTRAVENOUS at 08:10

## 2024-03-14 RX ADMIN — OXYTOCIN 1 MILLI-UNITS/MIN: 10 INJECTION, SOLUTION INTRAMUSCULAR; INTRAVENOUS at 09:31

## 2024-03-14 RX ADMIN — Medication 10 ML/HR: at 15:40

## 2024-03-14 RX ADMIN — Medication 10 ML/HR: at 08:22

## 2024-03-14 RX ADMIN — ACETAMINOPHEN 1000 MG: 500 TABLET ORAL at 20:45

## 2024-03-14 RX ADMIN — SODIUM CHLORIDE, POTASSIUM CHLORIDE, SODIUM LACTATE AND CALCIUM CHLORIDE 1000 ML: 600; 310; 30; 20 INJECTION, SOLUTION INTRAVENOUS at 07:25

## 2024-03-14 RX ADMIN — SODIUM CHLORIDE, PRESERVATIVE FREE 10 ML: 5 INJECTION INTRAVENOUS at 20:45

## 2024-03-14 RX ADMIN — SODIUM CHLORIDE, POTASSIUM CHLORIDE, SODIUM LACTATE AND CALCIUM CHLORIDE: 600; 310; 30; 20 INJECTION, SOLUTION INTRAVENOUS at 08:21

## 2024-03-14 RX ADMIN — IBUPROFEN 800 MG: 400 TABLET, FILM COATED ORAL at 17:57

## 2024-03-14 RX ADMIN — HYDROMORPHONE HYDROCHLORIDE 1 MG: 1 INJECTION, SOLUTION INTRAMUSCULAR; INTRAVENOUS; SUBCUTANEOUS at 01:12

## 2024-03-14 RX ADMIN — Medication 166.7 ML: at 16:43

## 2024-03-14 RX ADMIN — DOCUSATE SODIUM 100 MG: 100 CAPSULE, LIQUID FILLED ORAL at 20:45

## 2024-03-14 RX ADMIN — SODIUM CHLORIDE, POTASSIUM CHLORIDE, SODIUM LACTATE AND CALCIUM CHLORIDE: 600; 310; 30; 20 INJECTION, SOLUTION INTRAVENOUS at 12:42

## 2024-03-14 RX ADMIN — ONDANSETRON 4 MG: 2 INJECTION INTRAMUSCULAR; INTRAVENOUS at 01:12

## 2024-03-14 RX ADMIN — LIDOCAINE HYDROCHLORIDE AND EPINEPHRINE 3 ML: 20; 10 INJECTION, SOLUTION INFILTRATION; PERINEURAL at 08:10

## 2024-03-14 ASSESSMENT — PAIN DESCRIPTION - LOCATION: LOCATION: PERINEUM

## 2024-03-14 ASSESSMENT — PAIN SCALES - GENERAL: PAINLEVEL_OUTOF10: 3

## 2024-03-14 NOTE — H&P
Labor and Delivery History and Physical  3/13/2024    Patient is a 28 y.o.  at 40w3d with kathia 3/10/24 who now is admitted to L&D for latent labor. She initially presents to the [facility] with c/o contractions at . She reports lots of cramping last night that got more intense this afternoon and are now q5-10 minutes. Was seen in the office today and was 2/70. Scheduled for IOL tomorrow. Has had some blood tinged mucous that she believes is her mucous plug. She reports good FM. Denies LOF, n/v/d, fever, cough, sore throat, SOB/difficulty breathing, loss of taste/smell, exposure to anyone with COVID, h/a, changes in vision, RUQ/epigastric pain.       She reports prenatal care with Dr. White c/b   Primary Provider: Christopher  \"Moise-LINE\"  G1   EDC by LMP/US     BREECH 36wk --> cephalic at 37 weeks!!     Social:  Yonathan,  for a manufacturing company, [employer]  Covid vaccinated  Hypothyroidism, followed by endocrine. TSH q tri; 17wk nl; 0.86  IOB labs: WNL  Genetic Screening: NIPT  Panorama low risk; HAVING A BOY  Anatomy:  normal  GTT: 67  Flu: given TDAP: done  COVID:done  RSV:done  Rhogam: Bpos  GBS: Negative  Circ:        PNC: Blood type: B            RH: pos            Hep B: non reactive            Rubella: immune            GBS status: negative            HIV: non reactive            RPR/TPA/VDRL: non reactive            GC/CT: negative            HSV serology: not noted on prenatal records, but patient denies any hx of HSV      The history is provided by the patient and medical records.             Chief Complaint   Patient presents with    Contractions       OBHX:   OB History          1    Para        Term                AB        Living             SAB        IAB        Ectopic        Molar        Multiple        Live Births                    PMH:   Past Medical History:   Diagnosis Date    Hypothyroidism        PSH: History reviewed. No pertinent surgical  with patient/partner, all questions asked/answered and they agree with plan  NARGIS Jenkins CNM updated and she will assume care of patient on L&D    RITO Rojas CNM  11:20 PM

## 2024-03-14 NOTE — LACTATION NOTE
This note was copied from a baby's chart.  . Mother reports she noticed breast changes during pregnancy. Has been able to express colostrum. Mother reports that baby has been nusing well and that she has heard audible swallows. Educated mother on feeding cues, offering both breasts at each feeding, and feeding at least every 2 to 3 hours. Mother will call this IBCLC back in to see the next feeding.     2150: This IBCLC called into room. Observed baby latched on right breast in the cross cradle hold. Audible swallows heard. Assisted mother in latching baby in the cross cradle hold on the left breast. Educated mother on deep latch techniques.     Feeding Plan:  Mother will keep baby skin to skin as often as possible, feed on demand, 8-12x/day , respond to feeding cues, obtain latch, listen for audible swallowing, be aware of signs of oxytocin release/ cramping,thirst,sleepiness while breastfeeding, offer both breasts,and will not limit feedings.  Mother agrees to utilize breast massage while nursing to facilitate lactogenesis.

## 2024-03-14 NOTE — ANESTHESIA POSTPROCEDURE EVALUATION
Department of Anesthesiology  Postprocedure Note    Patient: Gosia Miguel  MRN: [id number]  YOB: 1995  Date of evaluation: 3/14/2024    Procedure Summary       Date: 03/14/24 Room / Location:     Anesthesia Start: 0800 Anesthesia Stop: 1634    Procedure: Labor Analgesia Diagnosis:     Scheduled Providers:  Responsible Provider: Sue Alegria DO    Anesthesia Type: epidural ASA Status: 2            Anesthesia Type: No value filed.    Josesito Phase I:      Josesito Phase II:      Anesthesia Post Evaluation    Patient location during evaluation: PACU  Patient participation: complete - patient participated  Level of consciousness: awake and alert  Pain score: 0  Airway patency: patent  Nausea & Vomiting: no nausea and no vomiting  Cardiovascular status: blood pressure returned to baseline and hemodynamically stable  Respiratory status: acceptable and room air  Hydration status: euvolemic  Multimodal analgesia pain management approach  Pain management: adequate and satisfactory to patient    No notable events documented.

## 2024-03-14 NOTE — ED TRIAGE NOTES
3/13/2024  8:49 PM Patient arrived to OBED3 for R/O labor. Patient was seen in the office today and was 2cm. Patient reports positive fetal movement and denies any bleeding or loss of fluid.    2108: SblancoCNM at bedside to see patient and discuss plan of care. SVE 3/90/-2. Patient may come off monitors to walk and will recheck in 1-2hr.  2309: SBlancoCNM returns to bedside. SVE recheck 4/90/-2. Will admit for labor.    2320 Brief report given to Louise, Patient transferred up to L&Drm4

## 2024-03-14 NOTE — ANESTHESIA PROCEDURE NOTES
Epidural Block    Patient location during procedure: OB  Start time: 3/14/2024 8:00 AM  End time: 3/14/2024 8:10 AM  Reason for block: labor epidural  Staffing  Performed: anesthesiologist   Anesthesiologist: Sejal Pandey MD  Performed by: Sejal Pandey MD  Authorized by: Sejal Pandey MD    Epidural  Patient position: sitting  Prep: DuraPrep  Patient monitoring: cardiac monitor, continuous pulse ox and frequent blood pressure checks  Approach: midline  Location: L3-4  Injection technique: ZENAIDA saline  Provider prep: mask and sterile gloves  Needle  Needle type: Tuohy   Needle gauge: 17 G  Needle length: 3.5 in  Needle insertion depth: 5 cm  Catheter type: end hole  Catheter size: 18 G  Catheter at skin depth: 10 cm  Test dose: negativeCatheter Secured: tegaderm and tape  Assessment  Events: None  Hemodynamics: stable  Attempts: 1  Outcomes: uncomplicated and patient tolerated procedure well  Preanesthetic Checklist  Completed: patient identified, IV checked, site marked, risks and benefits discussed, surgical/procedural consents, equipment checked, pre-op evaluation, timeout performed, anesthesia consent given, oxygen available, monitors applied/VS acknowledged and fire risk safety assessment completed and verbalized

## 2024-03-14 NOTE — ED PROVIDER NOTES
[facility] History and Physical    Patient is a 28 y.o.  at 40w3d with kathia 3/10/24 who presents to the [facility] with c/o contractions at . She reports lots of cramping last night that got more intense this afternoon and are now q5-10 minutes. Was seen in the office today and was . Scheduled for IOL tomorrow. Has had some blood tinged mucous that she believes is her mucous plug. She reports good FM. Denies LOF, n/v/d, fever, cough, sore throat, SOB/difficulty breathing, loss of taste/smell, exposure to anyone with COVID, h/a, changes in vision, RUQ/epigastric pain.      She reports prenatal care with Dr. White c/b   Primary Provider: Christopher  \"Moise-LINE\"  G1   EDC by LMP/US    BREECH 36wk --> cephalic at 37 weeks!!    Social:  Yonathan,  for a [employer] company, rides horses  Covid vaccinated  Hypothyroidism, followed by endocrine. TSH q tri; 17wk nl; 0.86  IOB labs: WNL  Genetic Screening: NIPT  Panorama low risk; HAVING A BOY  Anatomy:  normal  GTT: 67  Flu: given TDAP: done  COVID:done  RSV:done  Rhogam: Bpos  GBS: Negative  Circ:       PNC: Blood type: B            RH: pos            Hep B: non reactive            Rubella: immune            GBS status: negative            HIV: non reactive            RPR/TPA/VDRL: non reactive            GC/CT: negative            HSV serology: not noted on prenatal records, but patient denies any hx of HSV     The history is provided by the patient and medical records.        Chief Complaint   Patient presents with    Contractions       Past Medical History:   Diagnosis Date    Hypothyroidism        History reviewed. No pertinent surgical history.      History reviewed. No pertinent family history.    Social History     Socioeconomic History    Marital status:      Spouse name: Not on file    Number of children: Not on file    Years of education: Not on file    Highest education level: Not on file   Occupational History    Not on file   Tobacco Use

## 2024-03-14 NOTE — DISCHARGE INSTRUCTIONS
Postpartum Support Groups  We know that all of us are dealing with a tremendous amount of uncertainty, confusion and disruption to our daily lives, which may result in increased anxiety, depression and fear. If you are feeling unsettled or worse, please know that we are here to help. During this time of increased caution and care for one another, [facility] [place] ([facility]) is offering virtual support groups to ALL MOTHERS in [place] regardless of the age of your child/children as a way to help weather this emotional storm together. Social support is an important part of self-care during this time of physical distancing.  Virtual postpartum support group meetings available at www.postpartumva.org  Warm Line: 153.150.8057    Breastfeeding Support Groups    and  of each month at [place]   and  of each month at [place]      [email or web address].[employer]-prenatal-education-events    General activities  For vaginal deliveries, be up and moving around but remember to rest! Your body grew and birthed a small human! Be kind to yourself and allow your body to heal. You may gradually resume your normal activities as soon as you feel up to it. You may begin walking and strolling with the baby when you feel ready. Stay close to home the first few times in case you tire quickly. Do not push yourself. This is not about getting fit fast. This is allowing your body to have some movement which will aid in healing. Fresh air and sunshine are refreshing for both you and your baby.  Avoid heavy lifting, strenuous exercise, and excessive stair climbing.  If you delivered by  section, take your time. Give yourself some yessica! You should be up and moving multiple times per day, this will help you to feel better faster. However, be mindful and do not push yourself. If you have a day that you feel very uncomfortable, you likely did too much the day before. Rest and recognize your

## 2024-03-14 NOTE — ANESTHESIA PRE PROCEDURE
Department of Anesthesiology  Preprocedure Note       Name:  Gosia Miguel   Age:  28 y.o.  :  1995                                          MRN:  [id number]         Date:  3/14/2024      Surgeon: * No surgeons listed *    Procedure: * No procedures listed *    Medications prior to admission:   Prior to Admission medications    Medication Sig Start Date End Date Taking? Authorizing Provider   cetirizine (ZYRTEC) 10 MG tablet Take 1 tablet by mouth daily   Yes Josseline Garsia MD   levothyroxine (SYNTHROID) 50 MCG tablet Daily, twice a day on Saturday 12/4/15   Josseline Garsia MD   Prenatal Vit-Fe Fumarate-FA (PRENATAL PO) Take by mouth    Josseline Garsia MD       Current medications:    Current Facility-Administered Medications   Medication Dose Route Frequency Provider Last Rate Last Admin   • ePHEDrine 50 MG/ML injection            • fentaNYL 2 mcg/mL BUPivacaine 0.125% in sodium chloride 0.9% 100 mL 0.2-0.125-0.9 MG/100ML-% epidural infusion            • fentaNYL 2 mcg/mL BUPivacaine 0.125% in sodium chloride 0.9% 100 mL epidural infusion  1-15 mL/hr Epidural Continuous Sejal Pandey MD       • naloxone 0.4 mg in 10 mL sodium chloride syringe   IntraVENous PRN Sejal Pandey MD       • ondansetron (ZOFRAN) injection 4 mg  4 mg IntraVENous Q6H PRN Sejal Pandey MD       • diphenhydrAMINE (BENADRYL) injection 12.5 mg  12.5 mg IntraVENous Q4H PRN Sejal Pandey MD       • terbutaline (BRETHINE) injection 0.25 mg  0.25 mg SubCUTAneous Once PRN Sue Woodward APRN - CNM       • lactated ringers IV soln infusion   IntraVENous Continuous Sue Woodward, APRN - CNM       • lactated ringers bolus 500 mL  500 mL IntraVENous PRN Kushal Woodwardhanie CHANTAL, APRN - CNM        Or   • lactated ringers bolus 1,000 mL  1,000 mL IntraVENous PRN Sue Woodward, APRN -  mL/hr at 24 0725 1,000 mL at 24 0725   • sodium chloride flush 0.9 % injection 5-40 mL  5-40 mL IntraVENous 2

## 2024-03-15 PROCEDURE — 6370000000 HC RX 637 (ALT 250 FOR IP): Performed by: OBSTETRICS & GYNECOLOGY

## 2024-03-15 PROCEDURE — 1120000000 HC RM PRIVATE OB

## 2024-03-15 RX ADMIN — IBUPROFEN 800 MG: 400 TABLET, FILM COATED ORAL at 01:10

## 2024-03-15 RX ADMIN — ACETAMINOPHEN 1000 MG: 500 TABLET ORAL at 04:18

## 2024-03-15 RX ADMIN — DOCUSATE SODIUM 100 MG: 100 CAPSULE, LIQUID FILLED ORAL at 09:49

## 2024-03-15 RX ADMIN — ACETAMINOPHEN 1000 MG: 500 TABLET ORAL at 17:59

## 2024-03-15 RX ADMIN — IBUPROFEN 800 MG: 400 TABLET, FILM COATED ORAL at 17:58

## 2024-03-15 RX ADMIN — IBUPROFEN 800 MG: 400 TABLET, FILM COATED ORAL at 09:47

## 2024-03-15 ASSESSMENT — PAIN DESCRIPTION - ORIENTATION
ORIENTATION: LOWER
ORIENTATION: LOWER

## 2024-03-15 ASSESSMENT — PAIN DESCRIPTION - DESCRIPTORS
DESCRIPTORS: ACHING;SORE
DESCRIPTORS: ACHING
DESCRIPTORS: ACHING

## 2024-03-15 ASSESSMENT — PAIN DESCRIPTION - LOCATION
LOCATION: PERINEUM
LOCATION: ABDOMEN;PERINEUM
LOCATION: ABDOMEN

## 2024-03-15 ASSESSMENT — PAIN DESCRIPTION - PAIN TYPE: TYPE: ACUTE PAIN

## 2024-03-15 ASSESSMENT — PAIN SCALES - GENERAL
PAINLEVEL_OUTOF10: 2
PAINLEVEL_OUTOF10: 6
PAINLEVEL_OUTOF10: 6

## 2024-03-15 ASSESSMENT — PAIN - FUNCTIONAL ASSESSMENT: PAIN_FUNCTIONAL_ASSESSMENT: ACTIVITIES ARE NOT PREVENTED

## 2024-03-15 NOTE — L&D DELIVERY NOTE
Oscar Miguel [[id number]]      Labor Events     Labor: No   Steroids: None  Cervical Ripening Date/Time:      Antibiotics Received during Labor: No  Rupture Date/Time:  3/14/24 11:49:00   Rupture Type: AROM  Fluid Color: Clear, Pink, Meconium  Meconium Consistency: Thick  Fluid Odor: None  Fluid Volume: Moderate  Induction: None  Augmentation: AROM, Oxytocin              Anesthesia    Method: Epidural       Labor Event Times      Labor onset date/time:  3/14/24 13:30:00     Dilation complete date/time:  3/14/24 15:40:00 EDT     Start pushing date/time:  3/14/2024 15:49:00   Decision date/time (emergent ):            Labor Length    1st stage: 2h 10m  2nd stage: 0h 54m  3rd stage: 0h 09m       Delivery Details      Delivery Date: 3/14/24 Delivery Time: 16:34:00   Delivery Type: Vaginal, Vacuum (Extractor)              Martin Presentation    Presentation: Vertex       Shoulder Dystocia    Shoulder Dystocia Present?: No       Assisted Delivery Details    Forceps Attempted?: No  Vacuum Extractor Attempted?: Yes  Indications: Fetal Heart Rate or Rhythm Abnormality   Vacuum Type: Kiwi   Vacuum Application Location: Low, Outlet   First Attempt Time Vacuum Applied: 1633 EDT    First Attempt Time Vacuum Removed: 1633 EDT         Second Attempt Time Vacuum Applied: 1634 EDT   Second Attempt Time Vacuum Removed: 1634 EDT               Number of Pop Offs: 0      Number of Pulls: 2    Low End Pressure Range (mmHg): 80    High End Pressure Range (mmHg): 80    Vacuum Applied By: DR BOO    Failed?: No          Cord    Complications: None  Delayed Cord Clamping?: Yes  Cord Clamped Date/Time: 3/14/2024 16:46:00  Cord Blood Disposition: Discard  Gases Sent?: No              Placenta    Date/Time: 3/14/2024 16:43:00  Removal: Spontaneous  Appearance: Intact  Disposition: Discarded       Lacerations    Episiotomy: Median  Perineal Lacerations: None  Other Lacerations: no non-perineal laceration  Number  of Repair Packets: 1       Vaginal Counts    Initial Count Personnel: BASIL SAHA RN  Initial Count Verified By: DR. BOO  Intial Sponge Count: Correct Intial Needles Count: Correct Intial Instruments Count: Correct   Final Count Personnel: BASIL SAHA RN  Final Count Verified By: DR. BOO  Accurate Final Count?: Yes       Blood Loss  Mother: Gosia Miguel #[id number]     Start of Mother's Information      Delivery Blood Loss  24 1330 - 24 2100      Quantitative Blood Loss (mL) Hospital Encounter 490 grams    Total  490 mL               End of Mother's Information  Mother: Gosia Miguel #[id number]                Delivery Providers    Delivering clinician: Kasey Boo MD     Provider Role    Kasey Boo MD Obstetrician    Zohreh Saha, RN Primary Nurse    Isadora Mcgill, RN Primary [place] Nurse    Rachel Turner, RN NICU Nurse    Vinnie Ochoa,  Respiratory Therapist    Brigid Adams, APRN - NP Nurse Practitioner     Midwife     Nursery Nurse               Assessment    Living Status: Living  Delivery Location Comment: LDR 4        Skin Color:   Heart Rate:   Reflex Irritability:   Muscle Tone:   Respiratory Effort:   Total:            1 Minute:    1    2    2    1    2    8         5 Minute:    1    2    2    2    2    9                                        Apgars Assigned By: JAKY ADAMS NICU NP              Resuscitation    Method: Stimulation, Suctioning, Bulb Suction             [place] Measurements      Birth Weight: 3125 g   Birth Length: 52.7 cm     Head Circumference: 33 cm     Abdominal Girth: 26.5 cm              Skin to Skin      Skin to Skin Initiation Date/Time: 3/14/24 16:50:00 EDT     Skin to Skin With: Mother

## 2024-03-15 NOTE — LACTATION NOTE
This note was copied from a baby's chart.  Baby nursing well after delivery, deep latch obtained, mother is comfortable, baby feeding vigorously with rhythmic suck, swallow, breathe pattern, both breasts offered, baby is skin to skin for feeding.   This is mother's first baby, she has adequate colostrum at this time, both are quick learners.

## 2024-03-16 VITALS
OXYGEN SATURATION: 98 % | TEMPERATURE: 98 F | RESPIRATION RATE: 16 BRPM | DIASTOLIC BLOOD PRESSURE: 67 MMHG | SYSTOLIC BLOOD PRESSURE: 109 MMHG | HEART RATE: 76 BPM

## 2024-03-16 PROCEDURE — 6370000000 HC RX 637 (ALT 250 FOR IP): Performed by: OBSTETRICS & GYNECOLOGY

## 2024-03-16 RX ORDER — IBUPROFEN 800 MG/1
800 TABLET ORAL EVERY 8 HOURS SCHEDULED
Qty: 30 TABLET | Refills: 1 | Status: SHIPPED | OUTPATIENT
Start: 2024-03-16

## 2024-03-16 RX ADMIN — DOCUSATE SODIUM 100 MG: 100 CAPSULE, LIQUID FILLED ORAL at 12:22

## 2024-03-16 RX ADMIN — IBUPROFEN 800 MG: 400 TABLET, FILM COATED ORAL at 12:22

## 2024-03-16 RX ADMIN — IBUPROFEN 800 MG: 400 TABLET, FILM COATED ORAL at 01:20

## 2024-03-16 RX ADMIN — ACETAMINOPHEN 1000 MG: 500 TABLET ORAL at 12:22

## 2024-03-16 RX ADMIN — ACETAMINOPHEN 1000 MG: 500 TABLET ORAL at 01:20

## 2024-03-16 ASSESSMENT — PAIN SCALES - GENERAL
PAINLEVEL_OUTOF10: 3
PAINLEVEL_OUTOF10: 6

## 2024-03-16 ASSESSMENT — PAIN DESCRIPTION - LOCATION
LOCATION: ABDOMEN;PERINEUM
LOCATION: ABDOMEN

## 2024-03-16 ASSESSMENT — PAIN DESCRIPTION - DESCRIPTORS
DESCRIPTORS: ACHING
DESCRIPTORS: SORE

## 2024-03-16 ASSESSMENT — PAIN DESCRIPTION - ORIENTATION: ORIENTATION: LOWER

## 2024-03-16 ASSESSMENT — PAIN - FUNCTIONAL ASSESSMENT: PAIN_FUNCTIONAL_ASSESSMENT: ACTIVITIES ARE NOT PREVENTED

## 2024-03-16 NOTE — LACTATION NOTE
This note was copied from a baby's chart.  Mother reports that baby is feeding well. Mother latched baby deeply on the left breast in the football hold. Audible swallows heard. Educated mother on cluster feeding, engorgement, pumping, offering both breasts at each feeding, and milk storage.     Feeding Plan:  Mother will keep baby skin to skin as often as possible, feed on demand, 8-12x/day , respond to feeding cues, obtain latch, listen for audible swallowing, be aware of signs of oxytocin release/ cramping,thirst,sleepiness while breastfeeding, offer both breasts,and will not limit feedings.  Mother agrees to utilize breast massage while nursing to facilitate lactogenesis.

## 2024-03-16 NOTE — DISCHARGE SUMMARY
Obstetrical Discharge Summary     Name: Gosia Miguel MRN: [id number]  SSN: [id number]    YOB: 1995  Age: 28 y.o.  Sex: female      Admit Date: 3/13/2024    Discharge Date: 3/16/2024     Admitting Physician: Katt Villaseñor MD     Attending Physician:  Kasey White MD     Admission Diagnoses: 40 weeks gestation of pregnancy [Z3A.40]  Abdominal pain during pregnancy in third trimester [O26.893, R10.9]    Procedure Performed:  * No surgery found *  Surgical     Used for misc procedures    Discharge Diagnoses:   Information for the patient's :  Oscar Miguel [[id number]]   @[id number]@      Additional Diagnoses:  No components found for: \"OBEXTABORH\", \"OBEXTABSCRN\", \"OBEXTRUBELLA\", \"OBEXTGRBS\"    Hospital Course: Normal hospital course following the delivery.    Patient Disposition: Home      Followup Care:  Discharge Condition: Stable  activity as tolerated and no sex for 6 weeks  regular diet      Patient Instructions:   Current Discharge Medication List        START taking these medications    Details   ibuprofen (ADVIL;MOTRIN) 800 MG tablet Take 1 tablet by mouth every 8 hours  Qty: 30 tablet, Refills: 1           CONTINUE these medications which have NOT CHANGED    Details   cetirizine (ZYRTEC) 10 MG tablet Take 1 tablet by mouth daily      levothyroxine (SYNTHROID) 50 MCG tablet Daily, twice a day on Saturday      Prenatal Vit-Fe Fumarate-FA (PRENATAL PO) Take by mouth             Reference my discharge instructions.    No follow-ups on file.     Signed By:  RITO Coker CNM     2024

## 2024-03-16 NOTE — PROGRESS NOTES
Labor Progress Note  Patient seen, fetal heart rate and contraction pattern evaluated, patient examined.  Patient resting comfortably now with epidural.  On 8 of pitocin.  Bladder cathed for 300cc clear urine; evidence of SROM clear fluid  /62   Pulse 93   Temp 99.1 °F (37.3 °C) (Oral)   Resp 14   LMP 06/04/2023   SpO2 98%     Physical Exam:  Cervical Exam:  5 cm dilated    80% effaced    -1 station    Presenting Part: cephalic  Membranes:  Spontaneous Rupture of Membranes; Amniotic Fluid: clear fluid  Uterine Activity: Frequency: Every 2-5 minutes and Intensity: moderate  Fetal Heart Rate: Reactive  Accelerations: yes    Assessment/Plan:  Reassuring fetal status, Continue plan for vaginal delivery.  Optimize position changes and pitocin.  Shared with couple that Gaston Henriquez CNM who they met earlier may resume care if delivery hasn't occurred by 5pm                 
Labor Progress Note  fetal heart rate and contraction pattern evaluated, pt resting after diluadid dose  /71   Pulse 88   Temp 99 °F (37.2 °C) (Oral)   Resp 18   LMP 06/04/2023   SpO2 99%     Physical Exam:  Cervical Exam:  deferred  Membranes:  Intact  Uterine Activity: 2-4  Fetal Heart Rate: cat 2    Assessment/Plan:  Reassuring fetal status, Continue plan for vaginal delivery    RITO TAMAYO - TANA                    
Post-Partum Day Number 1 Progress Note    Gosia Miguel     Assessment: Doing well, post partum day 1    Plan:  1. Continue routine postpartum and perineal care as well as maternal education.  2. The risks and benefits of the circumcision  procedure and anesthesia including: bleeding, infection, variability of cosmetic results were discussed at length with the mother. She is aware that future repeat procedures may be necessary. She gives informed consent to proceed as noted and her questions are answered.     Information for the patient's :  Oscar Miguel [[id number]]   Vaginal, Vacuum (Extractor)  Patient doing well without significant complaint.  Voiding without difficulty, normal lochia.    Vitals:  /84   Pulse 78   Temp 97.7 °F (36.5 °C)   Resp 16   LMP 2023   SpO2 99%   Breastfeeding Unknown   Temp (24hrs), Av.9 °F (37.2 °C), Min:97.6 °F (36.4 °C), Max:100 °F (37.8 °C)        Exam:   Patient without distress.                Abdomen soft, fundus firm, nontender                Perineum with normal lochia noted.                Lower extremities are negative for swelling, cords or tenderness.    Labs:     Lab Results   Component Value Date/Time    WBC 17.9 2024 04:55 AM    WBC 8.1 2023 09:29 AM    WBC 7.6 2023 12:00 AM    HGB 13.6 2024 04:55 AM    HGB 13.0 2023 09:29 AM    HGB 14.0 2023 12:00 AM    HCT 39.5 2024 04:55 AM    HCT 38.6 2023 09:29 AM    HCT 42.1 2023 12:00 AM     2024 04:55 AM     2023 09:29 AM     2023 12:00 AM       No results found for this or any previous visit (from the past 24 hour(s)).     
Postpartum Note    S/P , PPD#2  Ambulating and Voiding without diff  Sachi po and po meds well  Breastfeeding well established  Desires discharge home    O:  A,A&O x 3        VSS, Afebrile       Patient Vitals for the past 24 hrs:   Temp Pulse Resp BP SpO2   24 0500 98 °F (36.7 °C) 71 16 114/75 97 %   03/15/24 2030 97.9 °F (36.6 °C) 73 16 111/70 98 %   03/15/24 1758 97.7 °F (36.5 °C) 77 16 (!) 108/58 97 %   03/15/24 0940 97.7 °F (36.5 °C) 78 -- 124/84 99 %          Breasts soft, NT       Abd soft, NT/ND       FF@ U-1, ML       Scant Lochia Rubra       Perineum Intact       Ext without REP, Neg Jae's    A/P: Routine PP care          Discharge home in stable cond.          RTO 6 wks for PP exam, sooner prn        ALESIA Ochoa/TANA    
  Dressing Status Clean, dry & intact 03/14/24 1649   Dressing Type Transparent 03/14/24 1649        Opportunity for questions and clarification was provided.      Patient transported with:  Registered Nurse

## 2024-04-05 ENCOUNTER — OFFICE VISIT (OUTPATIENT)
Age: 29
End: 2024-04-05
Payer: COMMERCIAL

## 2024-04-05 ENCOUNTER — TELEPHONE (OUTPATIENT)
Age: 29
End: 2024-04-05

## 2024-04-05 VITALS — WEIGHT: 154 LBS | BODY MASS INDEX: 23.42 KG/M2 | DIASTOLIC BLOOD PRESSURE: 70 MMHG | SYSTOLIC BLOOD PRESSURE: 102 MMHG

## 2024-04-05 DIAGNOSIS — N61.0 MASTITIS: Primary | ICD-10-CM

## 2024-04-05 PROCEDURE — 99213 OFFICE O/P EST LOW 20 MIN: CPT | Performed by: OBSTETRICS & GYNECOLOGY

## 2024-04-05 RX ORDER — DICLOXACILLIN SODIUM 250 MG/1
500 CAPSULE ORAL 2 TIMES DAILY
Qty: 14 CAPSULE | Refills: 0 | Status: SHIPPED | OUTPATIENT
Start: 2024-04-05 | End: 2024-04-05

## 2024-04-05 NOTE — PROGRESS NOTES
Gosia Miguel is a 28 y.o. female presents for a problem visit.    Chief Complaint   Patient presents with    Breast Pain     Patient is 3 weeks post vaginal delivery. She is breastfeeding and c/o right breast pain, redness, had a fever last night with chills.    Ob/Gyn Hx:  LMP - Patient's last menstrual period was 06/04/2023.  Menses - recent pregnancy   Contraception - none.  Hx of STI - No        Health Maintenance:      1. Have you been to the ER, urgent care clinic, or hospitalized since your last visit?No    2. Have you seen or consulted any other health care providers outside of the [facility] since your last visit? No    Patient declines chaperone.    Lida Charles LPN

## 2024-04-05 NOTE — PROGRESS NOTES
Gosia Miguel is a 28 y.o. female who complains of  likely mastitis.  Fever yesterday; redness lower aspect right breast.  Infant thriving; breastfeeding going well    Her relevant past medical history:   Past Medical History:   Diagnosis Date    Hypothyroidism 2015        No past surgical history on file.  Social History     Occupational History    Not on file   Tobacco Use    Smoking status: Never    Smokeless tobacco: Never   Vaping Use    Vaping Use: Never used   Substance and Sexual Activity    Alcohol use: Not Currently    Drug use: Never    Sexual activity: Yes     Partners: Male     No family history on file.    No Known Allergies  Prior to Admission medications    Medication Sig Start Date End Date Taking? Authorizing Provider   ibuprofen (ADVIL;MOTRIN) 800 MG tablet Take 1 tablet by mouth every 8 hours 3/16/24  Yes Sintia Donohue APRN - CNM   levothyroxine (SYNTHROID) 50 MCG tablet Daily, twice a day on Saturday 12/4/15  Yes ProviderJosseline MD   Prenatal Vit-Fe Fumarate-FA (PRENATAL PO) Take by mouth   Yes Provider, MD Josseline        Review of Systems - History obtained from the patient  Constitutional: negative for weight loss, fever, night sweats  HEENT: negative for hearing loss, earache, congestion, snoring, sorethroat  CV: negative for chest pain, palpitations, edema  Resp: negative for cough, shortness of breath, wheezing  Breast: negative for breast lumps, nipple discharge, galactorrhea  GI: negative for change in bowel habits, abdominal pain, black or bloody stools  : negative for frequency, dysuria, hematuria  MSK: negative for back pain, joint pain, muscle pain  Skin: negative for itching, rash, hives  Neuro: negative for dizziness, headache, confusion, weakness  Psych: negative for anxiety, depression, change in mood  Heme/lymph: negative for bleeding, bruising, pallor      Objective:  /70   Wt 69.9 kg (154 lb)   LMP 06/04/2023   Breastfeeding Yes   BMI 23.42 kg/m²

## 2024-04-05 NOTE — TELEPHONE ENCOUNTER
PT name and  verified    27 yo last ov today 24, next ov 24    PT calling in reference to ov today and a [facility] message sent after her ov.  PT states rx:  dicloxacillin (DYNAPEN) 250 MG capsule 14 capsule 0 2024    Sig - Route: Take 2 capsules by mouth 2 times daily for 7 days - Oral    Sent to pharmacy as: Dicloxacillin Sodium 250 MG Oral Capsule (DYNAPEN)    E-Prescribing Status: Receipt confirmed by pharmacy (2024 10:21 AM EDT)       was sent to [facility] and PT went to  and notified PT it is not in stock so PT was seeing if rx could be sent to [facility].  When reviewing rx to resend to [facility] message popped up for the quantity, not enough for the way the rx was written.  Please advise if the quantity should be 28 based on quantity written( 4 tablets daily?)    Thank you

## 2024-05-06 ENCOUNTER — OFFICE VISIT (OUTPATIENT)
Age: 29
End: 2024-05-06

## 2024-05-06 VITALS — BODY MASS INDEX: 23.42 KG/M2 | WEIGHT: 154 LBS | SYSTOLIC BLOOD PRESSURE: 112 MMHG | DIASTOLIC BLOOD PRESSURE: 76 MMHG

## 2024-05-06 DIAGNOSIS — Z12.4 ROUTINE CERVICAL SMEAR: ICD-10-CM

## 2024-05-06 PROCEDURE — 0503F POSTPARTUM CARE VISIT: CPT | Performed by: OBSTETRICS & GYNECOLOGY

## 2024-05-06 RX ORDER — DOXYCYCLINE HYCLATE 100 MG
100 TABLET ORAL 2 TIMES DAILY
Qty: 14 TABLET | Refills: 0 | Status: SHIPPED | OUTPATIENT
Start: 2024-05-06 | End: 2024-05-13

## 2024-05-06 NOTE — PROGRESS NOTES
Chief Complaint   Patient presents with    Postpartum Care       Gosia Miguel is a 28 y.o. female returns for a routine post-partum follow-up visit. She delivered on 3/14/24 by Kasey White.  Type of delivery: normal spontaneous vaginal delivery    Problems: possible mastitis    Postpartum Depression: Not on file       Breastfeeding: yes  Bleeding Resolved: yes  Reviewed birth control options. Desires IUD.  Last Pap: due today      1. Have you been to the ER, urgent care clinic, or hospitalized since your last visit?No    2. Have you seen or consulted any other health care providers outside of the [facility] since your last visit? No    She declines  a chaperone during the gynecologic exam today.      Lida Charles LPN  
chaperone during today's visit.

## 2024-05-10 LAB
., LABCORP: NORMAL
CYTOLOGIST CVX/VAG CYTO: NORMAL
CYTOLOGY CVX/VAG DOC CYTO: NORMAL
CYTOLOGY CVX/VAG DOC THIN PREP: NORMAL
DX ICD CODE: NORMAL
Lab: NORMAL
OTHER STN SPEC: NORMAL

## 2024-05-20 ENCOUNTER — OFFICE VISIT (OUTPATIENT)
Age: 29
End: 2024-05-20
Payer: COMMERCIAL

## 2024-05-20 VITALS — BODY MASS INDEX: 23.26 KG/M2 | WEIGHT: 153 LBS | DIASTOLIC BLOOD PRESSURE: 76 MMHG | SYSTOLIC BLOOD PRESSURE: 128 MMHG

## 2024-05-20 DIAGNOSIS — Z30.430 ENCOUNTER FOR IUD INSERTION: Primary | ICD-10-CM

## 2024-05-20 LAB
HCG, PREGNANCY, URINE, POC: NEGATIVE
VALID INTERNAL CONTROL, POC: YES

## 2024-05-20 PROCEDURE — 58300 INSERT INTRAUTERINE DEVICE: CPT | Performed by: OBSTETRICS & GYNECOLOGY

## 2024-05-20 PROCEDURE — 81025 URINE PREGNANCY TEST: CPT | Performed by: OBSTETRICS & GYNECOLOGY

## 2024-05-20 NOTE — PROGRESS NOTES
Mirena IUD INSERTION  Indications:  Gosia Miguel is a ,  28 y.o. female Unavailable No LMP recorded.  Her LMP was normal in duration and amount of flow. She  presents for insertion of an IUD.    The risks, benefits and alternatives of IUD insertion were discussed in detail at last visit.  She also has reviewed appropriate IUD information. She has elected to proceed with the insertion today and she states she has no further questions. A urine pregnancy test was negative No results found for: \"SPEP\", \"UPEP\"  Procedure:  The pelvic exam revealed normal external genitalia. On bimanual exam the uterus was midposition and normal in size with no tenderness present. A speculum was inserted into the vagina and the cervix was visualized. The cervix was prepped with a betadine solution. The anterior lip of the cervix was grasped with an Allis tenaculum. The uterus was sounded with a Espinosa sound to 8centimeters. A Mirena IUD was then inserted without difficulty. The string was cut to 3 centimeters.She experienced a minimal amount of cramping.   Post Procedure Status:     The patient was observed for 5 minutes after the insertion. There were no complications.  Patient was given postop instructions and instructed to check on IUD string in 1 to 2 months or to have IUD check here in the office.  Patient was discharged in stable condition.

## 2024-05-20 NOTE — PROGRESS NOTES
Chief Complaint   Patient presents with    iud insertion   Mirena Insertion       Ob/Gyn Hx:  LMP - No LMP recorded.  Menses - none/breastfeeding     Health Maintenance:  Last Pap: 5/2024    Time Out  Chart reviewed for the following:  I have reviewed the History & Physical and updated Gosia Miguel allergies.    TIME OUT performed immediately prior to start of procedure:  I performed the following reviews on Gosia Miguel prior to the start of the procedure:  Patient was identified by name and date of birth   Agreement on procedure being performed was verified  Risks and Benefits explained to the patient  Consent was signed and verified  Time: 3:21 pm  Date of procedure: 5/20/2024  Procedure performed by: Kasey White  How tolerated by patient: Well  Post Procedural Pain Scale: 2-Hurts Minimally  Comments: None      1. Have you been to the ER, urgent care clinic, or hospitalized since your last visit?No    2. Have you seen or consulted any other health care providers outside of the [facility] since your last visit? No    She declines  a chaperone during the gynecologic exam today.      Lida Charles LPN

## 2024-05-31 RX ORDER — AZITHROMYCIN 250 MG/1
TABLET, FILM COATED ORAL
Qty: 6 TABLET | Refills: 0 | Status: SHIPPED | OUTPATIENT
Start: 2024-05-31 | End: 2024-06-10

## 2024-07-19 ENCOUNTER — OFFICE VISIT (OUTPATIENT)
Age: 29
End: 2024-07-19

## 2024-07-19 VITALS — SYSTOLIC BLOOD PRESSURE: 106 MMHG | BODY MASS INDEX: 22.81 KG/M2 | DIASTOLIC BLOOD PRESSURE: 68 MMHG | WEIGHT: 150 LBS

## 2024-07-19 DIAGNOSIS — Z30.431 IUD CHECK UP: Primary | ICD-10-CM

## 2024-07-19 RX ORDER — LEVONORGESTREL 52 MG/1
1 INTRAUTERINE DEVICE INTRAUTERINE ONCE
COMMUNITY

## 2024-07-19 NOTE — PROGRESS NOTES
Chief Complaint   Patient presents with    IUD Check   Patient here for IUD check. Inserted 8 weeks ago. Was bleeding for about 6 weeks after insertion, no bleeding this week.  Complains of painful intercourse.  Ob/Gyn Hx:    LMP - No LMP recorded (lmp unknown).  Menses - none   Contraception - IUD.  Hx of STI - No    SA - Yes      Health Maintenance:      1. Have you been to the ER, urgent care clinic, or hospitalized since your last visit?No    2. Have you seen or consulted any other health care providers outside of the [facility] since your last visit? No    Patient declines chaperone.    Lida Charles LPN

## 2024-07-19 NOTE — PROGRESS NOTES
Gosia Miguel is a 28 y.o. female who desires IUD check.  First intercourse following birth was after IUD insertion 8 weeks   bleeding has resolved and no discomfort except with intercourse    Her relevant past medical history:   Past Medical History:   Diagnosis Date    Hypothyroidism 2015        No past surgical history on file.  Social History     Occupational History    Not on file   Tobacco Use    Smoking status: Never    Smokeless tobacco: Never   Vaping Use    Vaping Use: Never used   Substance and Sexual Activity    Alcohol use: Not Currently    Drug use: Never    Sexual activity: Yes     Partners: Male     No family history on file.    No Known Allergies  Prior to Admission medications    Medication Sig Start Date End Date Taking? Authorizing Provider   levonorgestrel (MIRENA, 52 MG,) IUD 52 mg 1 each by IntraUTERine route once   Yes Josseline Garsia MD   levothyroxine (SYNTHROID) 50 MCG tablet Daily, twice a day on Saturday 12/4/15  Yes Josseline Garsia MD   Prenatal Vit-Fe Fumarate-FA (PRENATAL PO) Take by mouth   Yes Josseline Garsia MD        Review of Systems - History obtained from the patient  Constitutional: negative for weight loss, fever, night sweats  HEENT: negative for hearing loss, earache, congestion, snoring, sorethroat  CV: negative for chest pain, palpitations, edema  Resp: negative for cough, shortness of breath, wheezing  Breast: negative for breast lumps, nipple discharge, galactorrhea  GI: negative for change in bowel habits, abdominal pain, black or bloody stools  : negative for frequency, dysuria, hematuria  MSK: negative for back pain, joint pain, muscle pain  Skin: negative for itching, rash, hives  Neuro: negative for dizziness, headache, confusion, weakness  Psych: negative for anxiety, depression, change in mood  Heme/lymph: negative for bleeding, bruising, pallor      Objective:  /68   Wt 68 kg (150 lb)   LMP  (LMP Unknown)   Breastfeeding No   BMI

## 2025-05-05 DIAGNOSIS — Z30.431 IUD CHECK UP: Primary | ICD-10-CM

## 2025-05-13 ENCOUNTER — OFFICE VISIT (OUTPATIENT)
Age: 30
End: 2025-05-13
Payer: COMMERCIAL

## 2025-05-13 VITALS
HEART RATE: 77 BPM | HEIGHT: 68 IN | WEIGHT: 146.8 LBS | DIASTOLIC BLOOD PRESSURE: 77 MMHG | OXYGEN SATURATION: 98 % | SYSTOLIC BLOOD PRESSURE: 114 MMHG | RESPIRATION RATE: 16 BRPM | BODY MASS INDEX: 22.25 KG/M2 | TEMPERATURE: 98.8 F

## 2025-05-13 DIAGNOSIS — Z30.432 ENCOUNTER FOR IUD REMOVAL: ICD-10-CM

## 2025-05-13 DIAGNOSIS — Z01.419 ENCOUNTER FOR GYNECOLOGICAL EXAMINATION WITHOUT ABNORMAL FINDING: Primary | ICD-10-CM

## 2025-05-13 PROCEDURE — 58301 REMOVE INTRAUTERINE DEVICE: CPT | Performed by: OBSTETRICS & GYNECOLOGY

## 2025-05-13 PROCEDURE — 99395 PREV VISIT EST AGE 18-39: CPT | Performed by: OBSTETRICS & GYNECOLOGY

## 2025-05-13 RX ORDER — TRETINOIN 0.25 MG/G
CREAM TOPICAL
COMMUNITY
Start: 2025-05-09

## 2025-05-13 RX ORDER — SPIRONOLACTONE 25 MG/1
TABLET ORAL
COMMUNITY
Start: 2025-05-05

## 2025-05-13 RX ORDER — CLINDAMYCIN PHOSPHATE 10 MG/G
GEL TOPICAL
COMMUNITY
Start: 2025-05-10

## 2025-05-13 RX ORDER — DROSPIRENONE AND ETHINYL ESTRADIOL 0.03MG-3MG
1 KIT ORAL DAILY
Qty: 3 PACKET | Refills: 4 | Status: SHIPPED | OUTPATIENT
Start: 2025-05-13

## 2025-05-13 SDOH — ECONOMIC STABILITY: FOOD INSECURITY: WITHIN THE PAST 12 MONTHS, YOU WORRIED THAT YOUR FOOD WOULD RUN OUT BEFORE YOU GOT MONEY TO BUY MORE.: NEVER TRUE

## 2025-05-13 SDOH — ECONOMIC STABILITY: FOOD INSECURITY: WITHIN THE PAST 12 MONTHS, THE FOOD YOU BOUGHT JUST DIDN'T LAST AND YOU DIDN'T HAVE MONEY TO GET MORE.: NEVER TRUE

## 2025-05-13 ASSESSMENT — PATIENT HEALTH QUESTIONNAIRE - PHQ9
SUM OF ALL RESPONSES TO PHQ QUESTIONS 1-9: 0
2. FEELING DOWN, DEPRESSED OR HOPELESS: NOT AT ALL
1. LITTLE INTEREST OR PLEASURE IN DOING THINGS: NOT AT ALL

## 2025-05-13 NOTE — PROGRESS NOTES
Gosia Miguel is a 29 y.o. female returns for an annual exam     Chief Complaint   Patient presents with    IUD check     Discuss US          Identified pt with two pt identifiers(name and ). Reviewed record in preparation for visit and have obtained necessary documentation. All patient medications has been reviewed        Vitals:    25 1104   BP: 114/77   BP Site: Left Upper Arm   Patient Position: Sitting   BP Cuff Size: Small Adult   Pulse: 77   Resp: 16   Temp: 98.8 °F (37.1 °C)   TempSrc: Oral   SpO2: 98%   Weight: 66.6 kg (146 lb 12.8 oz)   Height: 1.727 m (5' 8\")          OB/GYN History   - vaginal   Hx of STI - No  SA - Yes, Male      No LMP recorded.  Menses: Irregular periods with spotting   Contraception: IUD - Mirena - Placed 24      Health Maintenance  Last Pap: 2024  With regard to the Gardisil vaccine, she is unsure if received      1. Have you been to the ER, urgent care clinic, or hospitalized since your last visit? No  2. Have you seen or consulted any other health care providers outside of the [facility] since your last visit? No      She declines  a chaperone during the gynecologic exam today.  
No   Physical Activity: Not on file   Stress: Not on file   Social Connections: Not on file   Intimate Partner Violence: Not on file   Housing Stability: Low Risk  (5/13/2025)    Housing Stability Vital Sign     Unable to Pay for Housing in the Last Year: No     Number of Times Moved in the Last Year: 1     Homeless in the Last Year: No       Current Outpatient Medications   Medication Sig Dispense Refill    clindamycin 1 % gel APPLY GEL TO FACE TWICE DAILY      spironolactone (ALDACTONE) 25 MG tablet TAKE THREE TABLETS BY MOUTH EVERY DAY      tretinoin (RETIN-A) 0.025 % cream APPLY PEA SIZED AMOUNT TO WHOLE FACE AT BEDTIME. START WITH APPLICATIONS EVERY OTHER NIGHT AND INCREASE TO NIGHTLY AS TOLERATED. PAIR WITH M      drospirenone-ethinyl estradiol (OCELLA) 3-0.03 MG TABS Take 1 tablet by mouth daily 3 packet 4    levonorgestrel (MIRENA, 52 MG,) IUD 52 mg 1 each by IntraUTERine route once      levothyroxine (SYNTHROID) 50 MCG tablet Daily, twice a day on Saturday      Prenatal Vit-Fe Fumarate-FA (PRENATAL PO) Take by mouth       No current facility-administered medications for this visit.       No Known Allergies    Review of Systems - History obtained from the patient  Constitutional: negative for weight loss, fever, night sweats  HEENT: negative for hearing loss, earache, congestion, snoring, sorethroat  CV: negative for chest pain, palpitations, edema  Resp: negative for cough, shortness of breath, wheezing  GI: negative for change in bowel habits, abdominal pain, black or bloody stools  : negative for frequency, dysuria, hematuria, vaginal discharge  MSK: negative for back pain, joint pain, muscle pain  Breast: negative for breast lumps, nipple discharge, galactorrhea  Skin :negative for itching, rash, hives  Neuro: negative for dizziness, headache, confusion, weakness  Psych: negative for anxiety, depression, change in mood  Heme/lymph: negative for bleeding, bruising, pallor    Physical Exam    /77

## 2025-05-17 LAB
., LABCORP: NORMAL
CYTOLOGIST CVX/VAG CYTO: NORMAL
CYTOLOGY CVX/VAG DOC CYTO: NORMAL
CYTOLOGY CVX/VAG DOC THIN PREP: NORMAL
DX ICD CODE: NORMAL
OTHER STN SPEC: NORMAL
SERVICE CMNT-IMP: NORMAL
STAT OF ADQ CVX/VAG CYTO-IMP: NORMAL

## 2025-05-19 ENCOUNTER — RESULTS FOLLOW-UP (OUTPATIENT)
Age: 30
End: 2025-05-19